# Patient Record
Sex: FEMALE | Race: WHITE | NOT HISPANIC OR LATINO | URBAN - METROPOLITAN AREA
[De-identification: names, ages, dates, MRNs, and addresses within clinical notes are randomized per-mention and may not be internally consistent; named-entity substitution may affect disease eponyms.]

---

## 2022-04-25 ENCOUNTER — OFFICE VISIT (OUTPATIENT)
Dept: HEMATOLOGY/ONCOLOGY | Facility: CLINIC | Age: 82
End: 2022-04-25
Payer: MEDICARE

## 2022-04-25 VITALS
TEMPERATURE: 98 F | BODY MASS INDEX: 29.37 KG/M2 | RESPIRATION RATE: 16 BRPM | SYSTOLIC BLOOD PRESSURE: 163 MMHG | WEIGHT: 172 LBS | HEIGHT: 64 IN | DIASTOLIC BLOOD PRESSURE: 69 MMHG | HEART RATE: 99 BPM

## 2022-04-25 DIAGNOSIS — M19.90 ARTHRITIS: ICD-10-CM

## 2022-04-25 DIAGNOSIS — D69.6 THROMBOCYTOPENIA: Primary | ICD-10-CM

## 2022-04-25 DIAGNOSIS — R53.82 CHRONIC FATIGUE, UNSPECIFIED: ICD-10-CM

## 2022-04-25 DIAGNOSIS — R60.0 LOCALIZED EDEMA: ICD-10-CM

## 2022-04-25 DIAGNOSIS — N18.9 CHRONIC KIDNEY DISEASE, UNSPECIFIED CKD STAGE: ICD-10-CM

## 2022-04-25 PROBLEM — I10 HYPERTENSION: Status: ACTIVE | Noted: 2022-04-25

## 2022-04-25 PROBLEM — K21.9 GERD (GASTROESOPHAGEAL REFLUX DISEASE): Status: ACTIVE | Noted: 2022-04-25

## 2022-04-25 PROBLEM — H40.9 GLAUCOMA: Status: ACTIVE | Noted: 2022-04-25

## 2022-04-25 PROBLEM — H26.9 CATARACT: Status: ACTIVE | Noted: 2022-04-25

## 2022-04-25 PROBLEM — E78.5 HYPERLIPIDEMIA: Status: ACTIVE | Noted: 2022-04-25

## 2022-04-25 PROBLEM — E11.9 DIABETES: Status: ACTIVE | Noted: 2022-04-25

## 2022-04-25 PROBLEM — M32.9 SYSTEMIC LUPUS ERYTHEMATOSUS: Status: ACTIVE | Noted: 2022-04-25

## 2022-04-25 PROCEDURE — 99205 OFFICE O/P NEW HI 60 MIN: CPT | Mod: S$GLB,,, | Performed by: NURSE PRACTITIONER

## 2022-04-25 PROCEDURE — 99205 PR OFFICE/OUTPT VISIT, NEW, LEVL V, 60-74 MIN: ICD-10-PCS | Mod: S$GLB,,, | Performed by: NURSE PRACTITIONER

## 2022-04-25 RX ORDER — ALBUTEROL SULFATE 90 UG/1
2 AEROSOL, METERED RESPIRATORY (INHALATION) 4 TIMES DAILY
COMMUNITY
Start: 2022-02-17

## 2022-04-25 RX ORDER — FLUTICASONE PROPIONATE AND SALMETEROL 500; 50 UG/1; UG/1
1 POWDER RESPIRATORY (INHALATION)
COMMUNITY
Start: 2022-03-29

## 2022-04-25 RX ORDER — FLUTICASONE PROPIONATE 50 MCG
1 SPRAY, SUSPENSION (ML) NASAL DAILY
COMMUNITY
Start: 2022-03-29

## 2022-04-25 NOTE — PROGRESS NOTES
[facility] Hematolgy/Oncology  History & Physical    Subjective:      Patient ID:   NAME: Kaylin Jama : 1940     81 y.o. female    Referring Doc: Rafi Rosado MD  Other Physicians: Dr. Cody ([place])         Chief Complaint: Thrombocytopenia      HPI:  81 y.o. female with diagnosis of thrombocytopenia who has been referred by Rafi Rosado MD for evaluation by medical hematology/oncology.     The patient states that she has had a low platelet count for about a year, with the latest drop starting two months ago.     She states she has dizziness, fatigue, and lower extremity swelling.   Denies CP, SOB, and Cough.     She recently saw Dr. Rosado who ordered a cardiac stress test, Pulmonary function tests, and an EKG.     She is due to follow up with the results of those with Dr. Rosado.     Patient is a retired nurse.       ROS:   Review of Systems   Constitutional: Positive for activity change and fatigue. Negative for appetite change and fever.   HENT: Positive for congestion. Negative for mouth sores, postnasal drip, rhinorrhea, sinus pressure, sore throat and trouble swallowing.    Eyes: Positive for visual disturbance. Negative for photophobia.        Near sighted with blurring.      Respiratory: Positive for shortness of breath (on exertion). Negative for cough, chest tightness and wheezing.    Cardiovascular: Positive for leg swelling. Negative for chest pain.   Gastrointestinal: Negative for abdominal distention, abdominal pain, constipation, diarrhea, nausea and vomiting.   Endocrine: Negative for cold intolerance.   Genitourinary: Negative for decreased urine volume, dysuria and frequency.   Musculoskeletal: Positive for arthralgias (bilateral hip pain) and back pain. Negative for myalgias.   Skin: Negative for pallor and rash.   Allergic/Immunologic: Negative for immunocompromised state.   Neurological: Positive for dizziness. Negative for syncope, weakness,  "light-headedness, numbness and headaches.   Hematological: Negative for adenopathy. Bruises/bleeds easily.   Psychiatric/Behavioral: Negative for sleep disturbance. The patient is not nervous/anxious.              Past Medical/Surgical History:  Past Medical History:   Diagnosis Date    Arthritis     Cataract     Chronic kidney disease     Diabetes mellitus     GERD (gastroesophageal reflux disease)     Glaucoma     Hyperlipidemia     Hypertension     Hypothyroidism     Lupus (systemic lupus erythematosus)     reported on history but declined by patient.      Past Surgical History:   Procedure Laterality Date    FEMORAL ARTERY STENT      HYSTERECTOMY         Allergies:  Review of patient's allergies indicates:   Allergen Reactions    Amoxicillin-pot clavulanate Rash    Iodinated contrast media Rash       Social/Family History:  Social History     Socioeconomic History    Marital status:      No family history on file.      Medications:    Current Outpatient Medications:     fluticasone-salmeterol diskus inhaler 500-50 mcg, Inhale 1 puff into the lungs., Disp: , Rfl:     albuterol (PROVENTIL/VENTOLIN HFA) 90 mcg/actuation inhaler, Inhale 2 puffs into the lungs 4 (four) times daily., Disp: , Rfl:     fluticasone propionate (FLONASE) 50 mcg/actuation nasal spray, 1 spray by Each Nostril route once daily., Disp: , Rfl:       Pathology:  Cancer Staging  No matching staging information was found for the patient.      Objective:   Vitals:  Blood pressure (Abnormal) 163/69, pulse 99, temperature 97.9 °F (36.6 °C), resp. rate 16, height 5' 4" (1.626 m), weight 78 kg (172 lb).    Physical Examination:   Physical Exam  Constitutional:       Appearance: Normal appearance.   HENT:      Head: Normocephalic and atraumatic.      Nose: Nose normal.      Mouth/Throat:      Mouth: Mucous membranes are moist.   Eyes:      Pupils: Pupils are equal, round, and reactive to light.   Cardiovascular:      Rate and " Rhythm: Normal rate and regular rhythm.      Pulses: Normal pulses.      Heart sounds: Normal heart sounds.   Pulmonary:      Effort: Pulmonary effort is normal. No respiratory distress.      Breath sounds: Normal breath sounds. No wheezing.   Abdominal:      General: There is no distension.      Palpations: Abdomen is soft. There is no mass.      Tenderness: There is no abdominal tenderness.   Musculoskeletal:         General: No swelling. Normal range of motion.      Cervical back: Normal range of motion.      Right lower leg: Edema present.      Left lower leg: Edema present.      Comments: Uses cane to ambulate   Skin:     General: Skin is warm and dry.      Capillary Refill: Capillary refill takes 2 to 3 seconds.      Findings: Bruising present. No rash.   Neurological:      Mental Status: She is alert and oriented to person, place, and time. Mental status is at baseline.      Motor: No weakness.   Psychiatric:         Mood and Affect: Mood normal.         Behavior: Behavior normal.            Labs:               Radiology/Diagnostic Studies:    N/A      All lab results and imaging results have been reviewed and discussed with the patient    Assessment:   (1) 81 y.o. female with diagnosis of thrombocytopenia from Dr. Rosado. She has had a lowering platelet count for over a year. We will do a full blood panel thrombocytopenia workup at [facility].     (2) Obstructive lung diseaase   - had PFTs recently, awaiting results     (3) chronic fatigue   - cardiac stress test recently, awaiting the results from Dr. Rosado    (4) Arthritis     VISIT DIAGNOSES:        Thrombocytopenia  -     Platelet Antibody, Direct, Flow Cytometry; Future; Expected date: 04/25/2022  -     Platelet antibodies, indirect; Future; Expected date: 04/25/2022  -     Radha-Barr Virus (EBV) Antibody Panel; Future; Expected date: 04/25/2022  -     HIV 1/2 Ag/Ab (4th Gen); Future; Expected date: 04/25/2022  -     Hepatitis Panel, Acute; Future;  Expected date: 04/25/2022  -     US Abdomen Limited; Future; Expected date: 04/25/2022  -     PAOLA Screen w/Reflex; Future; Expected date: 04/25/2022  -     CBC Auto Differential; Future; Expected date: 04/25/2022    Arthritis    Chronic kidney disease, unspecified CKD stage  -     CMP; Future; Expected date: 04/25/2022    Chronic fatigue, unspecified   -     Hepatitis Panel, Acute; Future; Expected date: 04/25/2022            Plan:     PLAN:  1. Labs at [facility], CBC, CMP, viral panels, PAOLA, platelet antibody direct and indirect   2. US of liver and spleen   3. Continue to follow up on results from cardiac stress test and PFTs  4. Follow up with PCP     Follow up in about 4 weeks (around 5/23/2022) for with Dr. Smith.    Fax note to Rafi Rosado MD and Dr. Smith         I have explained and the patient understands all of  the current recommendation(s). I have answered all of their questions to the best of my ability and to their complete satisfaction.             Thank you for allowing me to participate in this patient's care. Please call with any questions or concerns.    Electronically signed Filomena Beck, MSN,APRN,AGNP-C

## 2022-05-11 ENCOUNTER — HOSPITAL ENCOUNTER (OUTPATIENT)
Dept: RADIOLOGY | Facility: HOSPITAL | Age: 82
Discharge: HOME | End: 2022-05-11
Attending: NURSE PRACTITIONER
Payer: MEDICARE

## 2022-05-11 ENCOUNTER — TELEPHONE (OUTPATIENT)
Dept: HEMATOLOGY/ONCOLOGY | Facility: CLINIC | Age: 82
End: 2022-05-11

## 2022-05-11 DIAGNOSIS — K86.89 PANCREATIC MASS: Primary | ICD-10-CM

## 2022-05-11 DIAGNOSIS — D69.6 THROMBOCYTOPENIA: ICD-10-CM

## 2022-05-11 PROCEDURE — 76700 US EXAM ABDOM COMPLETE: CPT | Mod: TC,PO

## 2022-05-11 NOTE — TELEPHONE ENCOUNTER
----- Message from Fabrizio Smith MD sent at 5/11/2022  3:00 PM CDT -----  We need to get a CT with pancreatic protocol

## 2022-05-20 LAB
ALBUMIN SERPL-MCNC: 3.6 G/DL (ref 3.6–5.1)
ALBUMIN/GLOB SERPL: 1.6 (CALC) (ref 1–2.5)
ALP SERPL-CCNC: 92 U/L (ref 37–153)
ALT SERPL-CCNC: 11 U/L (ref 6–29)
ANA SER QL IF: NEGATIVE
AST SERPL-CCNC: 22 U/L (ref 10–35)
BASOPHILS # BLD AUTO: 42 CELLS/UL (ref 0–200)
BASOPHILS NFR BLD AUTO: 1 %
BILIRUB SERPL-MCNC: 1 MG/DL (ref 0.2–1.2)
BUN SERPL-MCNC: 25 MG/DL (ref 7–25)
BUN/CREAT SERPL: 23 (CALC) (ref 6–22)
CALCIUM SERPL-MCNC: 8.9 MG/DL (ref 8.6–10.4)
CHLORIDE SERPL-SCNC: 111 MMOL/L (ref 98–110)
CO2 SERPL-SCNC: 25 MMOL/L (ref 20–32)
COMMENT: NORMAL
CREAT SERPL-MCNC: 1.07 MG/DL (ref 0.6–0.88)
EBV NA IGG SER IA-ACNC: 119 U/ML
EBV PATRN SPEC IB-IMP: ABNORMAL
EBV VCA IGG SER IA-ACNC: >750 U/ML
EBV VCA IGM SER IA-ACNC: <36 U/ML
EOSINOPHIL # BLD AUTO: 151 CELLS/UL (ref 15–500)
EOSINOPHIL NFR BLD AUTO: 3.6 %
ERYTHROCYTE [DISTWIDTH] IN BLOOD BY AUTOMATED COUNT: 13.2 % (ref 11–15)
GLOBULIN SER CALC-MCNC: 2.2 G/DL (CALC) (ref 1.9–3.7)
GLUCOSE SERPL-MCNC: 109 MG/DL (ref 65–99)
HAV IGM SERPL QL IA: NORMAL
HBV CORE IGM SERPL QL IA: NORMAL
HBV SURFACE AG SERPL QL IA: NORMAL
HCT VFR BLD AUTO: 33.9 % (ref 35–45)
HCV AB S/CO SERPL IA: 0.01
HCV AB SERPL QL IA: NORMAL
HGB BLD-MCNC: 11.2 G/DL (ref 11.7–15.5)
HIV 1+2 AB+HIV1 P24 AG SERPL QL IA: NORMAL
LYMPHOCYTES # BLD AUTO: 1289 CELLS/UL (ref 850–3900)
LYMPHOCYTES NFR BLD AUTO: 30.7 %
MCH RBC QN AUTO: 31.9 PG (ref 27–33)
MCHC RBC AUTO-ENTMCNC: 33 G/DL (ref 32–36)
MCV RBC AUTO: 96.6 FL (ref 80–100)
MONOCYTES # BLD AUTO: 202 CELLS/UL (ref 200–950)
MONOCYTES NFR BLD AUTO: 4.8 %
NEUTROPHILS # BLD AUTO: 2516 CELLS/UL (ref 1500–7800)
NEUTROPHILS NFR BLD AUTO: 59.9 %
PA IGG BLD QL FC: NEGATIVE
PLATELET # BLD AUTO: 77 THOUSAND/UL (ref 140–400)
PLATELET IGG SER QL FC: NEGATIVE
PLATELET IGM SER QL: NEGATIVE
PMV BLD REES-ECKER: 10.6 FL (ref 7.5–12.5)
POTASSIUM SERPL-SCNC: 4 MMOL/L (ref 3.5–5.3)
PROT SERPL-MCNC: 5.8 G/DL (ref 6.1–8.1)
RBC # BLD AUTO: 3.51 MILLION/UL (ref 3.8–5.1)
SODIUM SERPL-SCNC: 146 MMOL/L (ref 135–146)
WBC # BLD AUTO: 4.2 THOUSAND/UL (ref 3.8–10.8)

## 2022-05-24 ENCOUNTER — TELEPHONE (OUTPATIENT)
Dept: HEMATOLOGY/ONCOLOGY | Facility: CLINIC | Age: 82
End: 2022-05-24

## 2022-05-24 ENCOUNTER — HOSPITAL ENCOUNTER (OUTPATIENT)
Dept: RADIOLOGY | Facility: HOSPITAL | Age: 82
Discharge: HOME | End: 2022-05-24
Attending: NURSE PRACTITIONER
Payer: MEDICARE

## 2022-05-24 DIAGNOSIS — K86.2 PANCREATIC CYST: Primary | ICD-10-CM

## 2022-05-24 DIAGNOSIS — K86.89 PANCREATIC MASS: ICD-10-CM

## 2022-05-24 PROCEDURE — 74178 CT ABD&PLV WO CNTR FLWD CNTR: CPT | Mod: TC,PO

## 2022-05-24 PROCEDURE — 25500020 PHARM REV CODE 255: Mod: PO | Performed by: NURSE PRACTITIONER

## 2022-05-24 RX ADMIN — IOHEXOL 100 ML: 350 INJECTION, SOLUTION INTRAVENOUS at 09:05

## 2022-05-30 ENCOUNTER — OFFICE VISIT (OUTPATIENT)
Dept: HEMATOLOGY/ONCOLOGY | Facility: CLINIC | Age: 82
End: 2022-05-30
Payer: MEDICARE

## 2022-05-30 VITALS
RESPIRATION RATE: 18 BRPM | HEART RATE: 79 BPM | WEIGHT: 167 LBS | DIASTOLIC BLOOD PRESSURE: 79 MMHG | HEIGHT: 64 IN | SYSTOLIC BLOOD PRESSURE: 168 MMHG | BODY MASS INDEX: 28.51 KG/M2

## 2022-05-30 DIAGNOSIS — K74.60 HEPATIC CIRRHOSIS, UNSPECIFIED HEPATIC CIRRHOSIS TYPE, UNSPECIFIED WHETHER ASCITES PRESENT: ICD-10-CM

## 2022-05-30 DIAGNOSIS — D69.6 THROMBOCYTOPENIA: Primary | ICD-10-CM

## 2022-05-30 DIAGNOSIS — K86.2 CYSTIC MASS OF PANCREAS: ICD-10-CM

## 2022-05-30 DIAGNOSIS — R16.1 SPLENOMEGALY: ICD-10-CM

## 2022-05-30 DIAGNOSIS — R93.5 ABNORMAL US (ULTRASOUND) OF ABDOMEN: ICD-10-CM

## 2022-05-30 DIAGNOSIS — R53.82 CHRONIC FATIGUE, UNSPECIFIED: ICD-10-CM

## 2022-05-30 PROCEDURE — 99215 PR OFFICE/OUTPT VISIT, EST, LEVL V, 40-54 MIN: ICD-10-PCS | Mod: S$GLB,,, | Performed by: INTERNAL MEDICINE

## 2022-05-30 PROCEDURE — 99215 OFFICE O/P EST HI 40 MIN: CPT | Mod: S$GLB,,, | Performed by: INTERNAL MEDICINE

## 2022-05-30 NOTE — PROGRESS NOTES
[facility] Hematology/Oncology  PROGRESS NOTE - 2nd Follow-up Visit      Subjective:       Patient ID:   NAME: Kaylin Jama : 1940     81 y.o. female    Referring Doc: Rafi Rosado MD  Other Physicians: Dr. Cody ([place])            Chief Complaint: Thrombocytopenia f/u         History of Present Illness:     Patient returns today for a 2nd regularly scheduled follow-up visit.  The patient is here today to go over the results of the recently ordered labs, tests and studies. Patient was seen by my nurse practicioner Filomena Butler NP for the initial visit. She is here with her son and daughter-in-law. She is feeling a little dizzy especially with activity. She has some chronic weakness in the legs and fatigue.    She is breathing ok but does get DURAN. Eating ok with no N/V    Retired nurse    Discussed covid19 precautions and she has received her vaccinations and boosters                ROS:   GEN: normal without any fever, night sweats or weight loss; fatigue; dizziness  HEENT: normal with no HA's, sore throat, stiff neck, changes in vision  CV: normal with no CP, or palpitations; positive DURAN  PULM: normal with no SOB, cough, hemoptysis, sputum or pleuritic pain  GI: normal with no abdominal pain, nausea, vomiting, constipation, diarrhea, melanotic stools, BRBPR, or hematemesis  : normal with no hematuria, dysuria  BREAST: normal with no mass, discharge, pain  SKIN: normal with no rash, erythema, bruising, or swelling    Pain Scale: 3-4 chronic back pain     Allergies:  Review of patient's allergies indicates:   Allergen Reactions    Amoxicillin-pot clavulanate Rash    Iodinated contrast media Rash       Medications:    Current Outpatient Medications:     albuterol (PROVENTIL/VENTOLIN HFA) 90 mcg/actuation inhaler, Inhale 2 puffs into the lungs 4 (four) times daily., Disp: , Rfl:     fluticasone propionate (FLONASE) 50 mcg/actuation nasal spray, 1 spray by Each Nostril route once  "daily., Disp: , Rfl:     fluticasone-salmeterol diskus inhaler 500-50 mcg, Inhale 1 puff into the lungs., Disp: , Rfl:     PMHx/PSHx Updates:  See patient's last visit with me on Visit date not found.  See H&P on 4/25/2022        Pathology:  Cancer Staging  No matching staging information was found for the patient.          Objective:     Vitals:  Blood pressure (!) 168/79, pulse 79, resp. rate 18, height 5' 4" (1.626 m), weight 75.8 kg (167 lb).    Physical Examination:   GEN: no apparent distress, comfortable; AAOx3; overweight  HEAD: atraumatic and normocephalic  EYES: no pallor, no icterus, PERRLA  ENT: OMM, no pharyngeal erythema, external ears WNL; no nasal discharge; no thrush  NECK: no masses, thyroid normal, trachea midline, no LAD/LN's, supple  CV: RRR with no murmur; normal pulse; normal S1 and S2; no pedal edema  CHEST: Normal respiratory effort; CTAB; normal breath sounds; no wheeze or crackles  ABDOM: nontender and nondistended; soft; normal bowel sounds; no rebound/guarding  MUSC/Skeletal: LROM with arthropathy; uses cane to ambulate  EXTREM: no clubbing, cyanosis, inflammation or swelling  SKIN: no rashes, lesions, ulcers, petechiae or subcutaneous nodules  : no ayala  NEURO: grossly intact; motor/sensory WNL; AAOx3; no tremors  PSYCH: normal mood, affect and behavior  LYMPH: normal cervical, supraclavicular, axillary and groin LN's            Labs:     Lab Results   Component Value Date    WBC 4.2 05/11/2022    HGB 11.2 (L) 05/11/2022    HCT 33.9 (L) 05/11/2022    MCV 96.6 05/11/2022    PLT 77 (L) 05/11/2022       Hep A IgM NON-REACTIVE NON-REACTIVE    Comment  For additional information, please refer to http://education.[employer]/faq/YKH566 (This link is being provided for informational/ educational purposes only.)   Hepatitis B Surface Ag NON-REACTIVE NON-REACTIVE    Hep B C IgM NON-REACTIVE NON-REACTIVE    Hepatitis C Ab NON-REACTIVE NON-REACTIVE      HIV Ag/Ab 4th Gen NON-REACTIVE "     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    Platelet Ab.IgG NEGATIVE NEGATIVE    Platelet Ab.IgM NEGATIVE NEGATIVE      ANTI-IGG NEGATIVE NEGATIVE        CMP  Sodium   Date Value Ref Range Status   05/11/2022 146 135 - 146 mmol/L Final     Potassium   Date Value Ref Range Status   05/11/2022 4.0 3.5 - 5.3 mmol/L Final     Chloride   Date Value Ref Range Status   05/11/2022 111 (H) 98 - 110 mmol/L Final     CO2   Date Value Ref Range Status   05/11/2022 25 20 - 32 mmol/L Final     Glucose   Date Value Ref Range Status   05/11/2022 109 (H) 65 - 99 mg/dL Final     Comment:                   Fasting reference interval     For someone without known diabetes, a glucose value  between 100 and 125 mg/dL is consistent with  prediabetes and should be confirmed with a  follow-up test.          BUN   Date Value Ref Range Status   05/11/2022 25 7 - 25 mg/dL Final     Creatinine   Date Value Ref Range Status   05/11/2022 1.07 (H) 0.60 - 0.88 mg/dL Final     Comment:     For patients >49 years of age, the reference limit  for Creatinine is approximately 13% higher for people  identified as -American.          Calcium   Date Value Ref Range Status   05/11/2022 8.9 8.6 - 10.4 mg/dL Final     Total Protein   Date Value Ref Range Status   05/11/2022 5.8 (L) 6.1 - 8.1 g/dL Final     Albumin   Date Value Ref Range Status   05/11/2022 3.6 3.6 - 5.1 g/dL Final     Total Bilirubin   Date Value Ref Range Status   05/11/2022 1.0 0.2 - 1.2 mg/dL Final     AST   Date Value Ref Range Status   05/11/2022 22 10 - 35 U/L Final     ALT   Date Value Ref Range Status   05/11/2022 11 6 - 29 U/L Final     eGFR if    Date Value Ref Range Status   05/11/2022 56 (L) > OR = 60 mL/min/1.73m2 Final     eGFR if non    Date Value Ref Range Status   05/11/2022 48 (L) > OR = 60 mL/min/1.73m2 Final           Radiology/Diagnostic Studies:    CT Abdomen Pelvis W Wo Contrast    Result Date: 5/24/2022  All CT scans  at this facility used dose modulation, iterative reconstruction and/or weight-based dosing when appropriate to reduce radiation doses  as low as reasonably achievable. HISTORY: Abnormal ultrasound on 5/11/2022 which showed an 11 mm cystic mass within the body of the pancreas FINDINGS: Axial pre and postcontrast imaging was performed with 100 mL Omnipaque 350 IV contrast . CT ABDOMEN: There is pleural thickening within the left lung base. There are no pulmonary nodules or infiltrates. There is no pericardial effusion. There is a nodular contour of the liver which is small in size compatible with cirrhosis. There are no hepatic masses or biliary duct dilatation. There are multiple gallstones. There is no focal bladder wall thickening. The spleen is mildly enlarged with splenic varices compatible with portal hypertension. The pancreas demonstrates normal enhancement. There is an 8 mm cyst within the body of the pancreas. There are no enhancing pancreatic masses. There is no pancreatic ductal dilatation. The adrenal glands are normal. The kidneys enhance symmetrically without hydronephrosis or calculi. There are no thick-walled or dilated bowel loops. There is no mesenteric or retroperitoneal adenopathy. The aorta is normal in caliber. There is mild vascular calcification. There are stents within the iliac veins. There are degenerative changes of the spine with mild wedge compression of T12. CT PELVIS: There are no thick-walled or dilated bowel loops. The bladder is normal. There is minimal free fluid within the pelvis. The uterus is absent. There is diffuse anasarca.    IMPRESSION: 8 mm pancreatic cyst Nodular contour of the liver suggestive of cirrhosis with mild splenomegaly and splenic varices Cholelithiasis Degenerative changes of the spine Pleural thickening in the left lung base Electronically signed by:  Kathy Cano MD  5/24/2022 11:33 AM CDT Workstation: [id number]    US Abdomen Complete    Result Date:  5/11/2022  Complete abdominal ultrasound HISTORY: Thrombocytopenia. The liver is normal in overall size. There is mild diffuse heterogeneity of the hepatic echotexture. There are no focal hepatic parenchymal abnormalities demonstrated. Hepatopedal flow is observed within the portal vein. Numerous gallstones are observed within the gallbladder lumen. There is no gallbladder wall thickening or biliary dilatation. The kidneys are normal in size and position. No renal mass or hydronephrosis is observed. There is mild-moderate splenomegaly. The spleen measures 14.9 cm in sagittal dimension. No focal splenic parenchymal abnormality is observed. An 11 mm cystic mass is observed within the body of the pancreas. In the absence of prior exams which can be utilized document stability, correlation with contrast-enhanced CT is recommended. There are mild atheromatous changes within the wall of the abdominal aorta.     IMPRESSION: Mild-moderate splenomegaly. Cholelithiasis. 11 mm cystic mass within the pancreatic body. Correlation with contrast-enhanced CT or previous outside examinations is recommended. Electronically signed by:  Digna Iqbal MD  5/11/2022 10:53 AM CDT Workstation: [id number]      I have reviewed all available lab results and radiology reports.    Assessment/Plan:   (1) 81 y.o. female diagnosis of thrombocytopenia from Dr. Rosado. She has had a lowering platelet count for over a year.   - latest plat count at 77,000  - mild anemia with hgb at 11.2  - PAOLA negative  - US showing a cystlike mass in pancreas, cirrhotic changes to the liver and enlarged spleen  - suspect the thrombocytopenia is secondary to the;liver disease process  - hepatis panel was negative ans she does not drink or smoke  - discussed referral to Dr Delgadillo with GI     (2) COPD/Obstructive lung diseaase   - s/p recent PFT's      (3) Chronic fatigue   - cardiac stress test recently, awaiting the results from Dr. Rosado     (4) Arthritis      (5) CRI     (6) SLE/Lupus - no history of this per family    VISIT DIAGNOSES:      Thrombocytopenia    Systemic lupus erythematosus, unspecified SLE type, unspecified organ involvement status    Chronic fatigue, unspecified           PLAN:  1. Refer to Dr Delgadillo with GI  2. Monitor labs monthly for now  3. F/u with PCP  4. RTC in 4 weeks  Fax note to , Rafi Rosado MD, and  [place]    Discussion:     COVID-19 Discussion:    I had long discussion with patient and any applicable family about the COVID-19 coronavirus epidemic and the recommended precautions with regard to cancer and/or hematology patients. I have re-iterated the CDC recommendations for adequate hand washing, use of hand -like products, and coughing into elbow, etc. In addition, especially for our patients who are on chemotherapy and/or our otherwise immunocompromised patients, I have recommended avoidance of crowds, including movie theaters, restaurants, churches, etc. I have recommended avoidance of any sick or symptomatic family members and/or friends. I have also recommended avoidance of any raw and unwashed food products, and general avoidance of food items that have not been prepared by themselves. The patient has been asked to call us immediately with any symptom developments, issues, questions or other general concerns.     I spent over 25 mins of time with the patient. Reviewed results of the recently ordered labs, tests and studies; made directives with regards to the results. Over half of this time was spent couseling and coordinating care.    I have explained all of the above in detail and the patient understands all of the current recommendation(s). I have answered all of their questions to the best of my ability and to their complete satisfaction.   The patient is to continue with the current management plan.            Electronically signed by Fabrizio Smith MD

## 2022-06-17 LAB
ALBUMIN SERPL-MCNC: 3.6 G/DL (ref 3.6–4.6)
ALBUMIN/GLOB SERPL: 2 {RATIO} (ref 1.2–2.2)
ALP SERPL-CCNC: 90 IU/L (ref 44–121)
ALT SERPL-CCNC: 7 IU/L (ref 0–32)
AST SERPL-CCNC: 20 IU/L (ref 0–40)
BASOPHILS # BLD AUTO: 0 X10E3/UL (ref 0–0.2)
BASOPHILS NFR BLD AUTO: 1 %
BILIRUB SERPL-MCNC: 1 MG/DL (ref 0–1.2)
BUN SERPL-MCNC: 15 MG/DL (ref 8–27)
BUN/CREAT SERPL: 16 (ref 12–28)
CALCIUM SERPL-MCNC: 8.8 MG/DL (ref 8.7–10.3)
CHLORIDE SERPL-SCNC: 109 MMOL/L (ref 96–106)
CO2 SERPL-SCNC: 24 MMOL/L (ref 20–29)
CREAT SERPL-MCNC: 0.93 MG/DL (ref 0.57–1)
EOSINOPHIL # BLD AUTO: 0.2 X10E3/UL (ref 0–0.4)
EOSINOPHIL NFR BLD AUTO: 4 %
ERYTHROCYTE [DISTWIDTH] IN BLOOD BY AUTOMATED COUNT: 13.1 % (ref 11.7–15.4)
EST. GFR  (NO RACE VARIABLE): 61 ML/MIN/1.73
GLOBULIN SER CALC-MCNC: 1.8 G/DL (ref 1.5–4.5)
GLUCOSE SERPL-MCNC: 119 MG/DL (ref 65–99)
HCT VFR BLD AUTO: 34.3 % (ref 34–46.6)
HGB BLD-MCNC: 11.4 G/DL (ref 11.1–15.9)
IMM GRANULOCYTES # BLD AUTO: 0 X10E3/UL (ref 0–0.1)
IMM GRANULOCYTES NFR BLD AUTO: 0 %
LYMPHOCYTES # BLD AUTO: 1.4 X10E3/UL (ref 0.7–3.1)
LYMPHOCYTES NFR BLD AUTO: 33 %
MCH RBC QN AUTO: 32.3 PG (ref 26.6–33)
MCHC RBC AUTO-ENTMCNC: 33.2 G/DL (ref 31.5–35.7)
MCV RBC AUTO: 97 FL (ref 79–97)
MONOCYTES # BLD AUTO: 0.2 X10E3/UL (ref 0.1–0.9)
MONOCYTES NFR BLD AUTO: 5 %
MORPHOLOGY BLD-IMP: ABNORMAL
NEUTROPHILS # BLD AUTO: 2.4 X10E3/UL (ref 1.4–7)
NEUTROPHILS NFR BLD AUTO: 57 %
PLATELET # BLD AUTO: 74 X10E3/UL (ref 150–450)
POTASSIUM SERPL-SCNC: 3.9 MMOL/L (ref 3.5–5.2)
PROT SERPL-MCNC: 5.4 G/DL (ref 6–8.5)
RBC # BLD AUTO: 3.53 X10E6/UL (ref 3.77–5.28)
SODIUM SERPL-SCNC: 144 MMOL/L (ref 134–144)
WBC # BLD AUTO: 4.2 X10E3/UL (ref 3.4–10.8)

## 2022-06-22 NOTE — PROGRESS NOTES
[facility] Hematology/Oncology  PROGRESS NOTE -   Follow-up Visit      Subjective:       Patient ID:   NAME: Kaylin Jama : 1940     81 y.o. female    Referring Doc: Rafi Rosado MD  Other Physicians: Dr. Cody ([place])            Chief Complaint: Thrombocytopenia f/u         History of Present Illness:     Patient returns today for a regularly scheduled follow-up visit.  The patient is here today to go over the results of the recently ordered labs, tests and studies.      She is here by herself today. She has chronic back pain issues. Her BP is elevated today and she has not been taking her BP meds.     She is breathing ok but does get DURAN. Eating ok with no N/V    She uses a cane to ambulate    Discussed covid19 precautions and she has received her vaccinations and boosters                ROS:   GEN: normal without any fever, night sweats or weight loss; fatigue; dizziness; chronic back pain  HEENT: normal with no HA's, sore throat, stiff neck, changes in vision  CV: normal with no CP, or palpitations; positive DURAN  PULM: normal with no SOB, cough, hemoptysis, sputum or pleuritic pain  GI: normal with no abdominal pain, nausea, vomiting, constipation, diarrhea, melanotic stools, BRBPR, or hematemesis  : normal with no hematuria, dysuria  BREAST: normal with no mass, discharge, pain  SKIN: normal with no rash, erythema, bruising, or swelling    Pain Scale: 3-4 chronic back pain     Allergies:  Review of patient's allergies indicates:   Allergen Reactions    Amoxicillin-pot clavulanate Rash    Iodinated contrast media Rash       Medications:    Current Outpatient Medications:     albuterol (PROVENTIL/VENTOLIN HFA) 90 mcg/actuation inhaler, Inhale 2 puffs into the lungs 4 (four) times daily., Disp: , Rfl:     fluticasone propionate (FLONASE) 50 mcg/actuation nasal spray, 1 spray by Each Nostril route once daily., Disp: , Rfl:     fluticasone-salmeterol diskus inhaler 500-50 mcg, Inhale 1  "puff into the lungs., Disp: , Rfl:     PMHx/PSHx Updates:  See patient's last visit with me on 5/30/2022.  See H&P on 4/25/2022        Pathology:  Cancer Staging  No matching staging information was found for the patient.          Objective:     Vitals:  Blood pressure (!) 198/72, pulse 75, resp. rate 16, height 5' 4" (1.626 m), weight 76.2 kg (168 lb).    Physical Examination:   GEN: no apparent distress, comfortable; AAOx3; overweight  HEAD: atraumatic and normocephalic  EYES: no pallor, no icterus, PERRLA  ENT: OMM, no pharyngeal erythema, external ears WNL; no nasal discharge; no thrush  NECK: no masses, thyroid normal, trachea midline, no LAD/LN's, supple  CV: RRR with no murmur; normal pulse; normal S1 and S2; no pedal edema  CHEST: Normal respiratory effort; CTAB; normal breath sounds; no wheeze or crackles  ABDOM: nontender and nondistended; soft; normal bowel sounds; no rebound/guarding  MUSC/Skeletal: LROM with arthropathy; uses cane to ambulate  EXTREM: no clubbing, cyanosis, inflammation or swelling  SKIN: no rashes, lesions, ulcers, petechiae or subcutaneous nodules  : no ayala  NEURO: grossly intact; motor/sensory WNL; AAOx3; no tremors  PSYCH: normal mood, affect and behavior  LYMPH: normal cervical, supraclavicular, axillary and groin LN's            Labs:     Lab Results   Component Value Date    WBC 4.2 06/16/2022    HGB 11.4 06/16/2022    HCT 34.3 06/16/2022    MCV 97 06/16/2022    PLT 74 (LL) 06/16/2022       Hep A IgM NON-REACTIVE NON-REACTIVE    Comment  For additional information, please refer to http://education.Incu[employer].Stellarray/faq/FFM019 (This link is being provided for informational/ educational purposes only.)   Hepatitis B Surface Ag NON-REACTIVE NON-REACTIVE    Hep B C IgM NON-REACTIVE NON-REACTIVE    Hepatitis C Ab NON-REACTIVE NON-REACTIVE      HIV Ag/Ab 4th Gen NON-REACTIVE     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    Platelet Ab.IgG NEGATIVE NEGATIVE    Platelet " Ab.IgM NEGATIVE NEGATIVE      ANTI-IGG NEGATIVE NEGATIVE        CMP  Sodium   Date Value Ref Range Status   06/16/2022 144 134 - 144 mmol/L Final     Potassium   Date Value Ref Range Status   06/16/2022 3.9 3.5 - 5.2 mmol/L Final     Chloride   Date Value Ref Range Status   06/16/2022 109 (H) 96 - 106 mmol/L Final     CO2   Date Value Ref Range Status   06/16/2022 24 20 - 29 mmol/L Final     Glucose   Date Value Ref Range Status   06/16/2022 119 (H) 65 - 99 mg/dL Final     BUN   Date Value Ref Range Status   06/16/2022 15 8 - 27 mg/dL Final     Creatinine   Date Value Ref Range Status   06/16/2022 0.93 0.57 - 1.00 mg/dL Final     Calcium   Date Value Ref Range Status   06/16/2022 8.8 8.7 - 10.3 mg/dL Final     Total Protein   Date Value Ref Range Status   05/11/2022 5.8 (L) 6.1 - 8.1 g/dL Final     Albumin   Date Value Ref Range Status   06/16/2022 3.6 3.6 - 4.6 g/dL Final   05/11/2022 3.6 3.6 - 5.1 g/dL Final     Total Bilirubin   Date Value Ref Range Status   06/16/2022 1.0 0.0 - 1.2 mg/dL Final     AST   Date Value Ref Range Status   06/16/2022 20 0 - 40 IU/L Final     ALT   Date Value Ref Range Status   06/16/2022 7 0 - 32 IU/L Final     eGFR if    Date Value Ref Range Status   05/11/2022 56 (L) > OR = 60 mL/min/1.73m2 Final     eGFR if non    Date Value Ref Range Status   05/11/2022 48 (L) > OR = 60 mL/min/1.73m2 Final           Radiology/Diagnostic Studies:    CT Abdomen Pelvis W Wo Contrast    Result Date: 5/24/2022  All CT scans at this facility used dose modulation, iterative reconstruction and/or weight-based dosing when appropriate to reduce radiation doses  as low as reasonably achievable. HISTORY: Abnormal ultrasound on 5/11/2022 which showed an 11 mm cystic mass within the body of the pancreas FINDINGS: Axial pre and postcontrast imaging was performed with 100 mL Omnipaque 350 IV contrast . CT ABDOMEN: There is pleural thickening within the left lung base. There are no  pulmonary nodules or infiltrates. There is no pericardial effusion. There is a nodular contour of the liver which is small in size compatible with cirrhosis. There are no hepatic masses or biliary duct dilatation. There are multiple gallstones. There is no focal bladder wall thickening. The spleen is mildly enlarged with splenic varices compatible with portal hypertension. The pancreas demonstrates normal enhancement. There is an 8 mm cyst within the body of the pancreas. There are no enhancing pancreatic masses. There is no pancreatic ductal dilatation. The adrenal glands are normal. The kidneys enhance symmetrically without hydronephrosis or calculi. There are no thick-walled or dilated bowel loops. There is no mesenteric or retroperitoneal adenopathy. The aorta is normal in caliber. There is mild vascular calcification. There are stents within the iliac veins. There are degenerative changes of the spine with mild wedge compression of T12. CT PELVIS: There are no thick-walled or dilated bowel loops. The bladder is normal. There is minimal free fluid within the pelvis. The uterus is absent. There is diffuse anasarca.    IMPRESSION: 8 mm pancreatic cyst Nodular contour of the liver suggestive of cirrhosis with mild splenomegaly and splenic varices Cholelithiasis Degenerative changes of the spine Pleural thickening in the left lung base Electronically signed by:  Kathy Cano MD  5/24/2022 11:33 AM CDT Workstation: [id number]    US Abdomen Complete    Result Date: 5/11/2022  Complete abdominal ultrasound HISTORY: Thrombocytopenia. The liver is normal in overall size. There is mild diffuse heterogeneity of the hepatic echotexture. There are no focal hepatic parenchymal abnormalities demonstrated. Hepatopedal flow is observed within the portal vein. Numerous gallstones are observed within the gallbladder lumen. There is no gallbladder wall thickening or biliary dilatation. The kidneys are normal in size and  position. No renal mass or hydronephrosis is observed. There is mild-moderate splenomegaly. The spleen measures 14.9 cm in sagittal dimension. No focal splenic parenchymal abnormality is observed. An 11 mm cystic mass is observed within the body of the pancreas. In the absence of prior exams which can be utilized document stability, correlation with contrast-enhanced CT is recommended. There are mild atheromatous changes within the wall of the abdominal aorta.     IMPRESSION: Mild-moderate splenomegaly. Cholelithiasis. 11 mm cystic mass within the pancreatic body. Correlation with contrast-enhanced CT or previous outside examinations is recommended. Electronically signed by:  Digna Iqbal MD  5/11/2022 10:53 AM CDT Workstation: [id number]      I have reviewed all available lab results and radiology reports.    Assessment/Plan:   (1) 81 y.o. female diagnosis of thrombocytopenia from Dr. Rosado. She has had a lowering platelet count for over a year.   - latest plat count at 77,000  - mild anemia with hgb at 11.2  - PAOLA negative  - US showing a cystlike mass in pancreas, cirrhotic changes to the liver and enlarged spleen  - suspect the thrombocytopenia is secondary to the;liver disease process  - hepatis panel was negative ans she does not drink or smoke  - discussed referral to Dr Delgadillo with GI    6/23/2022:  - she saw Dr Delgadillo with GI and she has decided not to proceed with any further studies, scopes or workup  - plats currently 74,000 and relatively stable  - will continue to monitor labs every 3 months for now      (2) COPD/Obstructive lung diseaase   - s/p recent PFT's      (3) Chronic fatigue   - cardiac stress test recently, awaiting the results from Dr. Rosado     (4) Arthritis     (5) CRI     (6) SLE/Lupus - no history of this per family    VISIT DIAGNOSES:      Thrombocytopenia          PLAN:  1. F/u with GI as directed - she has decided not to pursue any further studies, workup or scopes  2.  Monitor labs every 3 months  3. F/u with PCP about her BP and dizziness isues  4. RTC in 6 months    Fax note to , Rafi Rosaod MD, and  [place]    Discussion:     COVID-19 Discussion:    I had long discussion with patient and any applicable family about the COVID-19 coronavirus epidemic and the recommended precautions with regard to cancer and/or hematology patients. I have re-iterated the CDC recommendations for adequate hand washing, use of hand -like products, and coughing into elbow, etc. In addition, especially for our patients who are on chemotherapy and/or our otherwise immunocompromised patients, I have recommended avoidance of crowds, including movie theaters, restaurants, churches, etc. I have recommended avoidance of any sick or symptomatic family members and/or friends. I have also recommended avoidance of any raw and unwashed food products, and general avoidance of food items that have not been prepared by themselves. The patient has been asked to call us immediately with any symptom developments, issues, questions or other general concerns.     I spent over 25 mins of time with the patient. Reviewed results of the recently ordered labs, tests and studies; made directives with regards to the results. Over half of this time was spent couseling and coordinating care.    I have explained all of the above in detail and the patient understands all of the current recommendation(s). I have answered all of their questions to the best of my ability and to their complete satisfaction.   The patient is to continue with the current management plan.            Electronically signed by Fabrizio Smith MD

## 2022-06-23 ENCOUNTER — OFFICE VISIT (OUTPATIENT)
Dept: HEMATOLOGY/ONCOLOGY | Facility: CLINIC | Age: 82
End: 2022-06-23
Payer: MEDICARE

## 2022-06-23 VITALS
SYSTOLIC BLOOD PRESSURE: 198 MMHG | HEIGHT: 64 IN | RESPIRATION RATE: 16 BRPM | WEIGHT: 168 LBS | HEART RATE: 75 BPM | BODY MASS INDEX: 28.68 KG/M2 | DIASTOLIC BLOOD PRESSURE: 72 MMHG

## 2022-06-23 DIAGNOSIS — D69.6 THROMBOCYTOPENIA: Primary | ICD-10-CM

## 2022-06-23 PROCEDURE — 99213 OFFICE O/P EST LOW 20 MIN: CPT | Mod: S$GLB,,, | Performed by: INTERNAL MEDICINE

## 2022-06-23 PROCEDURE — 99213 PR OFFICE/OUTPT VISIT, EST, LEVL III, 20-29 MIN: ICD-10-PCS | Mod: S$GLB,,, | Performed by: INTERNAL MEDICINE

## 2022-09-07 LAB
ALBUMIN SERPL-MCNC: 3.7 G/DL (ref 3.6–4.6)
ALBUMIN/GLOB SERPL: 1.8 {RATIO} (ref 1.2–2.2)
ALP SERPL-CCNC: 105 IU/L (ref 44–121)
ALT SERPL-CCNC: 9 IU/L (ref 0–32)
AST SERPL-CCNC: 20 IU/L (ref 0–40)
BASOPHILS # BLD AUTO: 0.1 X10E3/UL (ref 0–0.2)
BASOPHILS NFR BLD AUTO: 1 %
BILIRUB SERPL-MCNC: 0.9 MG/DL (ref 0–1.2)
BUN SERPL-MCNC: 26 MG/DL (ref 8–27)
BUN/CREAT SERPL: 23 (ref 12–28)
CALCIUM SERPL-MCNC: 9 MG/DL (ref 8.7–10.3)
CHLORIDE SERPL-SCNC: 114 MMOL/L (ref 96–106)
CO2 SERPL-SCNC: 24 MMOL/L (ref 20–29)
CREAT SERPL-MCNC: 1.14 MG/DL (ref 0.57–1)
EOSINOPHIL # BLD AUTO: 0.3 X10E3/UL (ref 0–0.4)
EOSINOPHIL NFR BLD AUTO: 6 %
ERYTHROCYTE [DISTWIDTH] IN BLOOD BY AUTOMATED COUNT: 13 % (ref 11.7–15.4)
EST. GFR  (NO RACE VARIABLE): 48 ML/MIN/1.73
GLOBULIN SER CALC-MCNC: 2.1 G/DL (ref 1.5–4.5)
GLUCOSE SERPL-MCNC: 122 MG/DL (ref 65–99)
HCT VFR BLD AUTO: 33.4 % (ref 34–46.6)
HGB BLD-MCNC: 10.8 G/DL (ref 11.1–15.9)
IMM GRANULOCYTES # BLD AUTO: 0 X10E3/UL (ref 0–0.1)
IMM GRANULOCYTES NFR BLD AUTO: 0 %
LYMPHOCYTES # BLD AUTO: 1.6 X10E3/UL (ref 0.7–3.1)
LYMPHOCYTES NFR BLD AUTO: 33 %
MCH RBC QN AUTO: 31.5 PG (ref 26.6–33)
MCHC RBC AUTO-ENTMCNC: 32.3 G/DL (ref 31.5–35.7)
MCV RBC AUTO: 97 FL (ref 79–97)
MONOCYTES # BLD AUTO: 0.3 X10E3/UL (ref 0.1–0.9)
MONOCYTES NFR BLD AUTO: 5 %
MORPHOLOGY BLD-IMP: ABNORMAL
NEUTROPHILS # BLD AUTO: 2.7 X10E3/UL (ref 1.4–7)
NEUTROPHILS NFR BLD AUTO: 55 %
PLATELET # BLD AUTO: 78 X10E3/UL (ref 150–450)
POTASSIUM SERPL-SCNC: 4 MMOL/L (ref 3.5–5.2)
PROT SERPL-MCNC: 5.8 G/DL (ref 6–8.5)
RBC # BLD AUTO: 3.43 X10E6/UL (ref 3.77–5.28)
SODIUM SERPL-SCNC: 148 MMOL/L (ref 134–144)
WBC # BLD AUTO: 4.9 X10E3/UL (ref 3.4–10.8)

## 2023-01-10 ENCOUNTER — TELEPHONE (OUTPATIENT)
Dept: HEMATOLOGY/ONCOLOGY | Facility: CLINIC | Age: 83
End: 2023-01-10

## 2023-01-10 NOTE — TELEPHONE ENCOUNTER
Called to remind patient about labs needed for upcoming appt on 1/12/23.  Pt stated that she [facility].

## 2023-01-11 NOTE — PROGRESS NOTES
[facility] Hematology/Oncology  PROGRESS NOTE -   Follow-up Visit      Subjective:       Patient ID:   NAME: Kaylin Jama : 1940     82 y.o. female    Referring Doc: Rafi Rosado MD  Other Physicians: Dr. Cody ([place])            Chief Complaint: Thrombocytopenia f/u         History of Present Illness:     Patient returns today for a regularly scheduled follow-up visit.  The patient is here today to go over the results of the recently ordered labs, tests and studies.  She is here with her  today.       She has chronic back pain issues. Her BP is a little elevated today and she has been taking her BP meds. She has some chronic fatigue, low energy and occasional dizziness    She saw Dr Roy with ENT since last visit    She is breathing ok but does get DURAN. Eating ok with no N/V    She uses a cane to ambulate    She had recent labs but they did not do CBC    Discussed covid19 precautions and she has received her vaccinations and boosters                ROS:   GEN: normal without any fever, night sweats or weight loss; fatigue; dizziness; chronic back pain  HEENT: normal with no HA's, sore throat, stiff neck, changes in vision  CV: normal with no CP, or palpitations; positive DURAN  PULM: normal with no SOB, cough, hemoptysis, sputum or pleuritic pain  GI: normal with no abdominal pain, nausea, vomiting, constipation, diarrhea, melanotic stools, BRBPR, or hematemesis  : normal with no hematuria, dysuria  BREAST: normal with no mass, discharge, pain  SKIN: normal with no rash, erythema, bruising, or swelling    Pain Scale: 3-4 chronic back pain     Allergies:  Review of patient's allergies indicates:   Allergen Reactions    Amoxicillin-pot clavulanate Rash    Iodinated contrast media Rash       Medications:    Current Outpatient Medications:     hydroCHLOROthiazide (HYDRODIURIL) 25 MG tablet, Take 25 mg by mouth., Disp: , Rfl:     albuterol (PROVENTIL/VENTOLIN HFA) 90 mcg/actuation  "inhaler, Inhale 2 puffs into the lungs 4 (four) times daily., Disp: , Rfl:     fluticasone propionate (FLONASE) 50 mcg/actuation nasal spray, 1 spray by Each Nostril route once daily., Disp: , Rfl:     fluticasone-salmeterol diskus inhaler 500-50 mcg, Inhale 1 puff into the lungs., Disp: , Rfl:     PMHx/PSHx Updates:  See patient's last visit with me on 6/23/2022.  See H&P on 4/25/2022        Pathology:   Cancer Staging   No matching staging information was found for the patient.          Objective:     Vitals:  Blood pressure (!) 158/71, pulse 62, temperature 97.1 °F (36.2 °C), resp. rate 16, height 5' 5" (1.651 m), weight 77.8 kg (171 lb 8 oz).    Physical Examination:   GEN: no apparent distress, comfortable; AAOx3; overweight  HEAD: atraumatic and normocephalic  EYES: no pallor, no icterus, PERRLA  ENT: OMM, no pharyngeal erythema, external ears WNL; no nasal discharge; no thrush  NECK: no masses, thyroid normal, trachea midline, no LAD/LN's, supple  CV: RRR with no murmur; normal pulse; normal S1 and S2; no pedal edema  CHEST: Normal respiratory effort; CTAB; normal breath sounds; no wheeze or crackles  ABDOM: nontender and nondistended; soft; normal bowel sounds; no rebound/guarding  MUSC/Skeletal: LROM with arthropathy; uses cane to ambulate  EXTREM: no clubbing, cyanosis, inflammation or swelling  SKIN: no rashes, lesions, ulcers, petechiae or subcutaneous nodules  : no ayala  NEURO: grossly intact; motor/sensory WNL; AAOx3; no tremors  PSYCH: normal mood, affect and behavior  LYMPH: normal cervical, supraclavicular, axillary and groin LN's            Labs:     Lab Results   Component Value Date    WBC 4.9 09/06/2022    HGB 10.8 (L) 09/06/2022    HCT 33.4 (L) 09/06/2022    MCV 97 09/06/2022    PLT 78 (LL) 09/06/2022       CMP  Sodium   Date Value Ref Range Status   09/06/2022 148 (H) 134 - 144 mmol/L Final     Potassium   Date Value Ref Range Status   09/06/2022 4.0 3.5 - 5.2 mmol/L Final     Chloride "   Date Value Ref Range Status   09/06/2022 114 (H) 96 - 106 mmol/L Final     CO2   Date Value Ref Range Status   09/06/2022 24 20 - 29 mmol/L Final     Glucose   Date Value Ref Range Status   09/06/2022 122 (H) 65 - 99 mg/dL Final     BUN   Date Value Ref Range Status   09/06/2022 26 8 - 27 mg/dL Final     Creatinine   Date Value Ref Range Status   09/06/2022 1.14 (H) 0.57 - 1.00 mg/dL Final     Calcium   Date Value Ref Range Status   09/06/2022 9.0 8.7 - 10.3 mg/dL Final     Total Protein   Date Value Ref Range Status   05/11/2022 5.8 (L) 6.1 - 8.1 g/dL Final     Albumin   Date Value Ref Range Status   09/06/2022 3.7 3.6 - 4.6 g/dL Final   05/11/2022 3.6 3.6 - 5.1 g/dL Final     Total Bilirubin   Date Value Ref Range Status   09/06/2022 0.9 0.0 - 1.2 mg/dL Final     AST   Date Value Ref Range Status   09/06/2022 20 0 - 40 IU/L Final     ALT   Date Value Ref Range Status   09/06/2022 9 0 - 32 IU/L Final     eGFR if    Date Value Ref Range Status   05/11/2022 56 (L) > OR = 60 mL/min/1.73m2 Final     eGFR if non    Date Value Ref Range Status   05/11/2022 48 (L) > OR = 60 mL/min/1.73m2 Final           Radiology/Diagnostic Studies:    CT Abdomen Pelvis W Wo Contrast    Result Date: 5/24/2022  All CT scans at this facility used dose modulation, iterative reconstruction and/or weight-based dosing when appropriate to reduce radiation doses  as low as reasonably achievable. HISTORY: Abnormal ultrasound on 5/11/2022 which showed an 11 mm cystic mass within the body of the pancreas FINDINGS: Axial pre and postcontrast imaging was performed with 100 mL Omnipaque 350 IV contrast . CT ABDOMEN: There is pleural thickening within the left lung base. There are no pulmonary nodules or infiltrates. There is no pericardial effusion. There is a nodular contour of the liver which is small in size compatible with cirrhosis. There are no hepatic masses or biliary duct dilatation. There are multiple  gallstones. There is no focal bladder wall thickening. The spleen is mildly enlarged with splenic varices compatible with portal hypertension. The pancreas demonstrates normal enhancement. There is an 8 mm cyst within the body of the pancreas. There are no enhancing pancreatic masses. There is no pancreatic ductal dilatation. The adrenal glands are normal. The kidneys enhance symmetrically without hydronephrosis or calculi. There are no thick-walled or dilated bowel loops. There is no mesenteric or retroperitoneal adenopathy. The aorta is normal in caliber. There is mild vascular calcification. There are stents within the iliac veins. There are degenerative changes of the spine with mild wedge compression of T12. CT PELVIS: There are no thick-walled or dilated bowel loops. The bladder is normal. There is minimal free fluid within the pelvis. The uterus is absent. There is diffuse anasarca.    IMPRESSION: 8 mm pancreatic cyst Nodular contour of the liver suggestive of cirrhosis with mild splenomegaly and splenic varices Cholelithiasis Degenerative changes of the spine Pleural thickening in the left lung base Electronically signed by:  Kathy Cano MD  5/24/2022 11:33 AM CDT Workstation: [id number]    US Abdomen Complete    Result Date: 5/11/2022  Complete abdominal ultrasound HISTORY: Thrombocytopenia. The liver is normal in overall size. There is mild diffuse heterogeneity of the hepatic echotexture. There are no focal hepatic parenchymal abnormalities demonstrated. Hepatopedal flow is observed within the portal vein. Numerous gallstones are observed within the gallbladder lumen. There is no gallbladder wall thickening or biliary dilatation. The kidneys are normal in size and position. No renal mass or hydronephrosis is observed. There is mild-moderate splenomegaly. The spleen measures 14.9 cm in sagittal dimension. No focal splenic parenchymal abnormality is observed. An 11 mm cystic mass is observed within the  body of the pancreas. In the absence of prior exams which can be utilized document stability, correlation with contrast-enhanced CT is recommended. There are mild atheromatous changes within the wall of the abdominal aorta.     IMPRESSION: Mild-moderate splenomegaly. Cholelithiasis. 11 mm cystic mass within the pancreatic body. Correlation with contrast-enhanced CT or previous outside examinations is recommended. Electronically signed by:  Digna Iqbal MD  5/11/2022 10:53 AM CDT Workstation: [id number]      I have reviewed all available lab results and radiology reports.    Assessment/Plan:   (1) 82 y.o. female diagnosis of thrombocytopenia from Dr. Rosado. She has had a lowering platelet count for over a year.   - latest plat count at 77,000  - mild anemia with hgb at 11.2  - PAOLA negative  - US showing a cystlike mass in pancreas, cirrhotic changes to the liver and enlarged spleen  - suspect the thrombocytopenia is secondary to the;liver disease process  - hepatis panel was negative ans she does not drink or smoke  - discussed referral to Dr Delgadillo with GI    6/23/2022:  - she saw Dr Delgadillo with GI and she has decided not to proceed with any further studies, scopes or workup  - plats currently 74,000 and relatively stable  - will continue to monitor labs every 3 months for now    1/12/2023:  - she had recent labs done per Dr kuhn but did not get  a cbc  - reorder labs for [facility]      (2) COPD/Obstructive lung diseaase   - s/p recent PFT's      (3) Chronic fatigue   - cardiac stress test recently, awaiting the results from Dr. Rosado     (4) Arthritis     (5) CRI     (6) SLE/Lupus - no history of this per family    VISIT DIAGNOSES:      Thrombocytopenia    Splenomegaly    Hepatic cirrhosis, unspecified hepatic cirrhosis type, unspecified whether ascites present    Cystic mass of pancreas          PLAN:  1. F/u with GI as directed - she has decided not to pursue any further studies, workup or scopes  2.  Monitor labs every 3 months  3. F/u with PCP about her BP and dizziness isues  4. F/u with ENT    RTC in 6 months    Fax note to , Rafi Rosado MD, [place]    Discussion:     COVID-19 Discussion:    I had long discussion with patient and any applicable family about the COVID-19 coronavirus epidemic and the recommended precautions with regard to cancer and/or hematology patients. I have re-iterated the CDC recommendations for adequate hand washing, use of hand -like products, and coughing into elbow, etc. In addition, especially for our patients who are on chemotherapy and/or our otherwise immunocompromised patients, I have recommended avoidance of crowds, including movie theaters, restaurants, churches, etc. I have recommended avoidance of any sick or symptomatic family members and/or friends. I have also recommended avoidance of any raw and unwashed food products, and general avoidance of food items that have not been prepared by themselves. The patient has been asked to call us immediately with any symptom developments, issues, questions or other general concerns.     I spent over 25 mins of time with the patient. Reviewed results of the recently ordered labs, tests and studies; made directives with regards to the results. Over half of this time was spent couseling and coordinating care.    I have explained all of the above in detail and the patient understands all of the current recommendation(s). I have answered all of their questions to the best of my ability and to their complete satisfaction.   The patient is to continue with the current management plan.            Electronically signed by Fabrizio Smith MD

## 2023-01-12 ENCOUNTER — OFFICE VISIT (OUTPATIENT)
Dept: HEMATOLOGY/ONCOLOGY | Facility: CLINIC | Age: 83
End: 2023-01-12
Payer: MEDICARE

## 2023-01-12 VITALS
RESPIRATION RATE: 16 BRPM | HEART RATE: 62 BPM | SYSTOLIC BLOOD PRESSURE: 158 MMHG | TEMPERATURE: 97 F | WEIGHT: 171.5 LBS | BODY MASS INDEX: 28.57 KG/M2 | HEIGHT: 65 IN | DIASTOLIC BLOOD PRESSURE: 71 MMHG

## 2023-01-12 DIAGNOSIS — K86.2 CYSTIC MASS OF PANCREAS: ICD-10-CM

## 2023-01-12 DIAGNOSIS — R16.1 SPLENOMEGALY: ICD-10-CM

## 2023-01-12 DIAGNOSIS — K74.60 HEPATIC CIRRHOSIS, UNSPECIFIED HEPATIC CIRRHOSIS TYPE, UNSPECIFIED WHETHER ASCITES PRESENT: ICD-10-CM

## 2023-01-12 DIAGNOSIS — D69.6 THROMBOCYTOPENIA: Primary | ICD-10-CM

## 2023-01-12 PROCEDURE — 99213 PR OFFICE/OUTPT VISIT, EST, LEVL III, 20-29 MIN: ICD-10-PCS | Mod: S$GLB,,, | Performed by: INTERNAL MEDICINE

## 2023-01-12 PROCEDURE — 99213 OFFICE O/P EST LOW 20 MIN: CPT | Mod: S$GLB,,, | Performed by: INTERNAL MEDICINE

## 2023-01-12 RX ORDER — HYDROCHLOROTHIAZIDE 25 MG/1
25 TABLET ORAL
COMMUNITY
Start: 2022-09-29 | End: 2023-09-29

## 2023-01-18 LAB
ALBUMIN SERPL-MCNC: 3.8 G/DL (ref 3.6–4.6)
ALBUMIN/GLOB SERPL: 1.8 {RATIO} (ref 1.2–2.2)
ALP SERPL-CCNC: 96 IU/L (ref 44–121)
ALT SERPL-CCNC: 16 IU/L (ref 0–32)
AST SERPL-CCNC: 26 IU/L (ref 0–40)
BASOPHILS # BLD AUTO: 0 X10E3/UL (ref 0–0.2)
BASOPHILS NFR BLD AUTO: 0 %
BILIRUB SERPL-MCNC: 0.7 MG/DL (ref 0–1.2)
BUN SERPL-MCNC: 22 MG/DL (ref 8–27)
BUN/CREAT SERPL: 18 (ref 12–28)
CALCIUM SERPL-MCNC: 9.3 MG/DL (ref 8.7–10.3)
CHLORIDE SERPL-SCNC: 108 MMOL/L (ref 96–106)
CO2 SERPL-SCNC: 24 MMOL/L (ref 20–29)
CREAT SERPL-MCNC: 1.19 MG/DL (ref 0.57–1)
EOSINOPHIL # BLD AUTO: 0.2 X10E3/UL (ref 0–0.4)
EOSINOPHIL NFR BLD AUTO: 3 %
ERYTHROCYTE [DISTWIDTH] IN BLOOD BY AUTOMATED COUNT: 12.8 % (ref 11.7–15.4)
EST. GFR  (NO RACE VARIABLE): 45 ML/MIN/1.73
FERRITIN SERPL-MCNC: 60 NG/ML (ref 15–150)
FOLATE SERPL-MCNC: 10.1 NG/ML
GLOBULIN SER CALC-MCNC: 2.1 G/DL (ref 1.5–4.5)
GLUCOSE SERPL-MCNC: 160 MG/DL (ref 70–99)
HCT VFR BLD AUTO: 34.5 % (ref 34–46.6)
HGB BLD-MCNC: 11.2 G/DL (ref 11.1–15.9)
IMM GRANULOCYTES # BLD AUTO: 0 X10E3/UL (ref 0–0.1)
IMM GRANULOCYTES NFR BLD AUTO: 0 %
IRON SATN MFR SERPL: 35 % (ref 15–55)
IRON SERPL-MCNC: 106 UG/DL (ref 27–139)
LYMPHOCYTES # BLD AUTO: 1.2 X10E3/UL (ref 0.7–3.1)
LYMPHOCYTES NFR BLD AUTO: 24 %
MCH RBC QN AUTO: 31.9 PG (ref 26.6–33)
MCHC RBC AUTO-ENTMCNC: 32.5 G/DL (ref 31.5–35.7)
MCV RBC AUTO: 98 FL (ref 79–97)
MONOCYTES # BLD AUTO: 0.3 X10E3/UL (ref 0.1–0.9)
MONOCYTES NFR BLD AUTO: 6 %
MORPHOLOGY BLD-IMP: ABNORMAL
NEUTROPHILS # BLD AUTO: 3.2 X10E3/UL (ref 1.4–7)
NEUTROPHILS NFR BLD AUTO: 67 %
PLATELET # BLD AUTO: 74 X10E3/UL (ref 150–450)
POTASSIUM SERPL-SCNC: 3.7 MMOL/L (ref 3.5–5.2)
PROT SERPL-MCNC: 5.9 G/DL (ref 6–8.5)
RBC # BLD AUTO: 3.51 X10E6/UL (ref 3.77–5.28)
SODIUM SERPL-SCNC: 144 MMOL/L (ref 134–144)
TIBC SERPL-MCNC: 307 UG/DL (ref 250–450)
UIBC SERPL-MCNC: 201 UG/DL (ref 118–369)
VIT B12 SERPL-MCNC: 395 PG/ML (ref 232–1245)
WBC # BLD AUTO: 4.9 X10E3/UL (ref 3.4–10.8)

## 2023-01-19 ENCOUNTER — TELEPHONE (OUTPATIENT)
Dept: HEMATOLOGY/ONCOLOGY | Facility: CLINIC | Age: 83
End: 2023-01-19

## 2023-01-19 NOTE — TELEPHONE ENCOUNTER
Message and labs reviewed with Dr. Smith and per his verbal orders, I spoke to patient and made aware that labs are stable at this time, continue with labs draws as previously instructed and keep scheduled f/u on 7/13, verbalized understanding.

## 2023-01-19 NOTE — TELEPHONE ENCOUNTER
----- Message from Katheryn Hooker sent at 1/19/2023  2:07 PM CST -----  The patient would like a call back with the results of her lab work from this week. # [phone]